# Patient Record
Sex: FEMALE | Race: WHITE | NOT HISPANIC OR LATINO | Employment: FULL TIME | URBAN - METROPOLITAN AREA
[De-identification: names, ages, dates, MRNs, and addresses within clinical notes are randomized per-mention and may not be internally consistent; named-entity substitution may affect disease eponyms.]

---

## 2018-09-27 ENCOUNTER — OFFICE VISIT (OUTPATIENT)
Dept: FAMILY MEDICINE CLINIC | Facility: CLINIC | Age: 23
End: 2018-09-27

## 2018-09-27 VITALS
HEART RATE: 80 BPM | DIASTOLIC BLOOD PRESSURE: 70 MMHG | BODY MASS INDEX: 22.08 KG/M2 | HEIGHT: 62 IN | WEIGHT: 120 LBS | SYSTOLIC BLOOD PRESSURE: 118 MMHG | OXYGEN SATURATION: 97 %

## 2018-09-27 DIAGNOSIS — R51.9 INTRACTABLE EPISODIC HEADACHE, UNSPECIFIED HEADACHE TYPE: Primary | ICD-10-CM

## 2018-09-27 PROCEDURE — 99203 OFFICE O/P NEW LOW 30 MIN: CPT | Performed by: FAMILY MEDICINE

## 2018-09-27 RX ORDER — NORGESTIMATE AND ETHINYL ESTRADIOL 7DAYSX3 LO
1 KIT ORAL DAILY
COMMUNITY
End: 2018-09-27 | Stop reason: SDUPTHER

## 2018-09-27 RX ORDER — NORGESTIMATE AND ETHINYL ESTRADIOL 7DAYSX3 LO
1 KIT ORAL DAILY
Qty: 28 TABLET | Refills: 11 | Status: SHIPPED | OUTPATIENT
Start: 2018-09-27

## 2018-10-02 ENCOUNTER — LAB (OUTPATIENT)
Dept: LAB | Facility: HOSPITAL | Age: 23
End: 2018-10-02

## 2018-10-02 ENCOUNTER — OFFICE VISIT (OUTPATIENT)
Dept: FAMILY MEDICINE CLINIC | Facility: CLINIC | Age: 23
End: 2018-10-02

## 2018-10-02 ENCOUNTER — TELEPHONE (OUTPATIENT)
Dept: FAMILY MEDICINE CLINIC | Facility: CLINIC | Age: 23
End: 2018-10-02

## 2018-10-02 VITALS
BODY MASS INDEX: 22.41 KG/M2 | HEIGHT: 62 IN | SYSTOLIC BLOOD PRESSURE: 122 MMHG | OXYGEN SATURATION: 98 % | WEIGHT: 121.8 LBS | HEART RATE: 86 BPM | TEMPERATURE: 98.9 F | DIASTOLIC BLOOD PRESSURE: 68 MMHG

## 2018-10-02 DIAGNOSIS — R53.83 FATIGUE, UNSPECIFIED TYPE: ICD-10-CM

## 2018-10-02 DIAGNOSIS — R51.9 INTRACTABLE EPISODIC HEADACHE, UNSPECIFIED HEADACHE TYPE: Primary | ICD-10-CM

## 2018-10-02 DIAGNOSIS — R51.9 INTRACTABLE EPISODIC HEADACHE, UNSPECIFIED HEADACHE TYPE: ICD-10-CM

## 2018-10-02 PROCEDURE — 36415 COLL VENOUS BLD VENIPUNCTURE: CPT

## 2018-10-02 PROCEDURE — 99213 OFFICE O/P EST LOW 20 MIN: CPT | Performed by: FAMILY MEDICINE

## 2018-10-02 PROCEDURE — 86789 WEST NILE VIRUS ANTIBODY: CPT | Performed by: FAMILY MEDICINE

## 2018-10-02 PROCEDURE — 86788 WEST NILE VIRUS AB IGM: CPT | Performed by: FAMILY MEDICINE

## 2018-10-02 NOTE — TELEPHONE ENCOUNTER
10/02/2018 12:28 PM Telephone (Outgoing)    Radha Hernandez RegSched Rep     spoke with silvia with [facility] and she is faxing over a cover sheet and I will fax over md office notes. to [phone]            Per referral communication this has been addressed and taken care of

## 2018-10-02 NOTE — TELEPHONE ENCOUNTER
CARMEL MARTINEZ FROM [facility] CALLED, STATED IN ORDER FOR THEM TO PROCESS THE BRAIN MRI PROCEDURE THEY NEED CLINICALS. PLEASE INDICATE THE TRACKING NUMBER ON ALL THE PAPERWORK THAT WILL BE FAXED.    PHONE:[phone]  FAX:[phone]    TRACKING #: [id number]

## 2018-10-02 NOTE — PROGRESS NOTES
Subjective   Paulette Portillo is a 23 y.o. female.     Chief Complaint   Patient presents with   • Headache     has subsided,  still having extreme fatigue, feels very tired and has been sleeping all week       History of Present Illness     Paulette Portillo presents today for follow-up on headache.  She reports that her headache symptoms have almost entirely resolved, she still occasionally gets a posterior sourced headache but this is infrequent and mild.  Her main residual symptom is extreme fatigue.  She reports malaise, overall fatigue that has been present since the beginning but has persisted despite the resolution of headache.  She did not have any fever but did have a decrease in her temperature to the low 97 range.  She has a history of mono, and the thinking at  was that this may be a reactivation.    The following portions of the patient's history were reviewed and updated as appropriate: allergies, current medications, past family history, past medical history, past social history, past surgical history and problem list.    Active Ambulatory Problems     Diagnosis Date Noted   • Headache 09/27/2018     Resolved Ambulatory Problems     Diagnosis Date Noted   • No Resolved Ambulatory Problems     Past Medical History:   Diagnosis Date   • Asthma    • Depression    • Fractures    • Headache      Past Surgical History:   Procedure Laterality Date   • BONE GRAFT     • WISDOM TOOTH EXTRACTION       Family History   Problem Relation Age of Onset   • Diabetes Father    • Hypertension Father    • Arthritis Maternal Grandmother      Social History     Social History   • Marital status: Single     Spouse name: N/A   • Number of children: N/A   • Years of education: N/A     Occupational History   • Not on file.     Social History Main Topics   • Smoking status: Never Smoker   • Smokeless tobacco: Never Used   • Alcohol use Yes      Comment: 2-3 drinks weekly    • Drug use: No   • Sexual activity:  "Defer     Other Topics Concern   • Not on file     Social History Narrative   • No narrative on file         Review of Systems   Constitutional: Positive for fatigue. Negative for fever.   Neurological: Positive for headaches.        Significantly improved       Objective   Blood pressure 122/68, pulse 86, temperature 98.9 °F (37.2 °C), temperature source Temporal Artery , height 157.5 cm (62\"), weight 55.2 kg (121 lb 12.8 oz), SpO2 98 %, not currently breastfeeding.  Nursing note reviewed  Physical Exam  Const: NAD, A&Ox4, Pleasant, Cooperative  Eyes: EOMI, no conjunctivitis  ENT: No nasal discharge present, neck supple  Cardiac: Regular rate and rhythm, no peripheral edema or cyanosis  Resp: Respiratory rate within normal limits, no increased work of breathing, no audible wheezing or retractions noted  GI: No distention or ascites  MSK: Motor and sensation grossly intact in bilateral upper extremities  Neurologic, CN II-XII grossly intact  Psych: Appropriate mood and behavior.  Skin: Pink, warm, dry  Procedures  Assessment/Plan   Paulette was seen today for headache.    Diagnoses and all orders for this visit:    Intractable episodic headache, unspecified headache type  -     West Nile Virus Antibody, IgM; Future    Fatigue, unspecified type  -     West Nile Virus Antibody, IgM; Future      #1 headache with fatigue  Certainly glad that the headache has resolved, likely this was a post lumbar puncture headache.  She does live in the same ZIP Code in [place] there was a case of West Nile confirmed not to long ago.  I would like to check an IgM today for that given her ongoing fatigue.  If that is negative then that pretty much complete the workup given the wide ranging labs and testing done at .  · Follow-up as needed    Patient Instructions   1.  OK to return to work tonight    2.  Follow up as needed      Ambulatory progress note signed and attested to by Vikram Yo D.O. on 10/2/2018 at 16:31.             "

## 2018-10-05 LAB
WEST NILE VIRUS ANTIBODY, IGM: NEGATIVE
WNV IGG SER-ACNC: NEGATIVE

## 2024-06-15 ENCOUNTER — READMISSION MANAGEMENT (OUTPATIENT)
Dept: CALL CENTER | Facility: HOSPITAL | Age: 29
End: 2024-06-15
Payer: COMMERCIAL

## 2024-06-15 NOTE — OUTREACH NOTE
Prep Survey      Flowsheet Row Responses   [facility] facility patient discharged from? Non-[facility]   Is LACE score < 7 ? Non-BH Discharge   Eligibility Not Eligible   What are the reasons patient is not eligible? Other  [Delivery of infant]   Does the patient have one of the following disease processes/diagnoses(primary or secondary)? Other   Prep survey completed? Yes            JACKY PERDUE - Registered Nurse